# Patient Record
Sex: MALE | Race: WHITE | URBAN - METROPOLITAN AREA
[De-identification: names, ages, dates, MRNs, and addresses within clinical notes are randomized per-mention and may not be internally consistent; named-entity substitution may affect disease eponyms.]

---

## 2019-12-31 ENCOUNTER — APPOINTMENT (OUTPATIENT)
Dept: URBAN - METROPOLITAN AREA SURGERY 9 | Age: 44
Setting detail: DERMATOLOGY
End: 2019-12-31

## 2019-12-31 DIAGNOSIS — L40.8 OTHER PSORIASIS: ICD-10-CM

## 2019-12-31 DIAGNOSIS — L40.0 PSORIASIS VULGARIS: ICD-10-CM

## 2019-12-31 PROCEDURE — OTHER TREATMENT REGIMEN: OTHER

## 2019-12-31 PROCEDURE — 11900 INJECT SKIN LESIONS </W 7: CPT

## 2019-12-31 PROCEDURE — OTHER INTRALESIONAL KENALOG: OTHER

## 2019-12-31 PROCEDURE — OTHER PRESCRIPTION: OTHER

## 2019-12-31 PROCEDURE — 99202 OFFICE O/P NEW SF 15 MIN: CPT | Mod: 25

## 2019-12-31 PROCEDURE — OTHER COUNSELING: OTHER

## 2019-12-31 RX ORDER — PIMECROLIMUS 10 MG/G
CREAM TOPICAL
Qty: 1 | Refills: 2 | Status: ERX

## 2019-12-31 RX ORDER — CLOBETASOL PROPIONATE 0.5 MG/ML
SOLUTION TOPICAL QHS
Qty: 1 | Refills: 3 | Status: ERX

## 2019-12-31 ASSESSMENT — LOCATION ZONE DERM
LOCATION ZONE: TRUNK
LOCATION ZONE: GENITALIA
LOCATION ZONE: SCALP

## 2019-12-31 ASSESSMENT — LOCATION SIMPLE DESCRIPTION DERM
LOCATION SIMPLE: SCALP
LOCATION SIMPLE: GLUTEAL CLEFT
LOCATION SIMPLE: GENITALIA

## 2019-12-31 ASSESSMENT — LOCATION DETAILED DESCRIPTION DERM
LOCATION DETAILED: GLUTEAL CLEFT
LOCATION DETAILED: RIGHT SUPERIOR PARIETAL SCALP
LOCATION DETAILED: GENITALIA

## 2019-12-31 NOTE — HPI: RASH
What Type Of Note Output Would You Prefer (Optional)?: Bullet Format
How Severe Is Your Rash?: moderate
Is This A New Presentation, Or A Follow-Up?: Rash
Additional History: Pt state he was prescribed an anti fungal and a cream but doesn’t know the exact names

## 2019-12-31 NOTE — PROCEDURE: TREATMENT REGIMEN
Start Regimen: Pimecrolimus apply twice daily to affected areas
Action 1: Continue
Detail Level: Zone
Start Regimen: Clobetasol solution apply twice daily for up to three weeks as needed for flares to scalp

## 2020-06-04 ENCOUNTER — APPOINTMENT (OUTPATIENT)
Dept: URBAN - METROPOLITAN AREA SURGERY 9 | Age: 45
Setting detail: DERMATOLOGY
End: 2020-06-12

## 2020-06-04 DIAGNOSIS — L40.8 OTHER PSORIASIS: ICD-10-CM

## 2020-06-04 DIAGNOSIS — L40.0 PSORIASIS VULGARIS: ICD-10-CM

## 2020-06-04 PROBLEM — L30.9 DERMATITIS, UNSPECIFIED: Status: ACTIVE | Noted: 2020-06-04

## 2020-06-04 PROCEDURE — OTHER TREATMENT REGIMEN: OTHER

## 2020-06-04 PROCEDURE — OTHER COUNSELING: OTHER

## 2020-06-04 PROCEDURE — 99213 OFFICE O/P EST LOW 20 MIN: CPT | Mod: 25

## 2020-06-04 PROCEDURE — OTHER INTRALESIONAL KENALOG: OTHER

## 2020-06-04 PROCEDURE — OTHER PRESCRIPTION: OTHER

## 2020-06-04 PROCEDURE — 11900 INJECT SKIN LESIONS </W 7: CPT

## 2020-06-04 PROCEDURE — 11104 PUNCH BX SKIN SINGLE LESION: CPT

## 2020-06-04 PROCEDURE — OTHER BIOPSY BY PUNCH METHOD: OTHER

## 2020-06-04 RX ORDER — MOMETASONE FUROATE 1 MG/ML
SOLUTION TOPICAL
Qty: 1 | Refills: 3 | Status: ERX | COMMUNITY
Start: 2020-06-04

## 2020-06-04 RX ORDER — KETOCONAZOLE 20 MG/G
CREAM TOPICAL
Qty: 1 | Refills: 2 | Status: ERX | COMMUNITY
Start: 2020-06-04

## 2020-06-04 ASSESSMENT — LOCATION ZONE DERM
LOCATION ZONE: TRUNK
LOCATION ZONE: GENITALIA
LOCATION ZONE: SCALP

## 2020-06-04 ASSESSMENT — LOCATION DETAILED DESCRIPTION DERM
LOCATION DETAILED: RIGHT CENTRAL PARIETAL SCALP
LOCATION DETAILED: GLUTEAL CLEFT
LOCATION DETAILED: RIGHT SUPERIOR PARIETAL SCALP
LOCATION DETAILED: GENITALIA

## 2020-06-04 ASSESSMENT — LOCATION SIMPLE DESCRIPTION DERM
LOCATION SIMPLE: GENITALIA
LOCATION SIMPLE: SCALP
LOCATION SIMPLE: GLUTEAL CLEFT

## 2020-06-04 NOTE — PROCEDURE: TREATMENT REGIMEN
Action 3: Continue
Discontinue Regimen: Clobetasol solution
Detail Level: Zone
Start Regimen: Mometasone solution
Continue Regimen: Pimecrolimus apply twice daily to affected areas
Start Regimen: Ketoconazole as directed

## 2020-08-03 ENCOUNTER — APPOINTMENT (OUTPATIENT)
Dept: URBAN - METROPOLITAN AREA SURGERY 9 | Age: 45
Setting detail: DERMATOLOGY
End: 2020-08-04

## 2020-08-03 DIAGNOSIS — L40.8 OTHER PSORIASIS: ICD-10-CM

## 2020-08-03 DIAGNOSIS — L40.0 PSORIASIS VULGARIS: ICD-10-CM

## 2020-08-03 PROCEDURE — OTHER OTHER: OTHER

## 2020-08-03 PROCEDURE — OTHER TREATMENT REGIMEN: OTHER

## 2020-08-03 PROCEDURE — 99213 OFFICE O/P EST LOW 20 MIN: CPT

## 2020-08-03 PROCEDURE — OTHER COUNSELING: OTHER

## 2020-08-03 ASSESSMENT — LOCATION ZONE DERM
LOCATION ZONE: EAR
LOCATION ZONE: SCALP
LOCATION ZONE: GENITALIA
LOCATION ZONE: TRUNK

## 2020-08-03 ASSESSMENT — LOCATION DETAILED DESCRIPTION DERM
LOCATION DETAILED: LEFT ANTIHELIX
LOCATION DETAILED: GLUTEAL CLEFT
LOCATION DETAILED: RIGHT SUPERIOR PARIETAL SCALP
LOCATION DETAILED: RIGHT INFERIOR CRUS OF ANTIHELIX
LOCATION DETAILED: GENITALIA

## 2020-08-03 ASSESSMENT — LOCATION SIMPLE DESCRIPTION DERM
LOCATION SIMPLE: GENITALIA
LOCATION SIMPLE: RIGHT EAR
LOCATION SIMPLE: GLUTEAL CLEFT
LOCATION SIMPLE: SCALP
LOCATION SIMPLE: LEFT EAR

## 2020-08-03 NOTE — PROCEDURE: TREATMENT REGIMEN
Plan: Can continue to use mometasone in scalp as needed.  Good improvement.  Will be trimming hair shorter which may help with increased sun. Other Instructions: Can continue to use mometasone in scalp as needed.  Good improvement.  Will be trimming hair shorter which may help with increased sun.

## 2020-08-03 NOTE — PROCEDURE: OTHER
Note Text (......Xxx Chief Complaint.): This diagnosis correlates with the
Other (Free Text): No joint symptoms
Detail Level: Simple

## 2020-11-20 RX ORDER — MOMETASONE FUROATE 1 MG/ML
SOLUTION TOPICAL
Qty: 1 | Refills: 3 | Status: ERX

## 2020-11-20 RX ORDER — PIMECROLIMUS 10 MG/G
CREAM TOPICAL
Qty: 1 | Refills: 2 | Status: ERX

## 2021-01-26 ENCOUNTER — APPOINTMENT (OUTPATIENT)
Dept: URBAN - METROPOLITAN AREA SURGERY 9 | Age: 46
Setting detail: DERMATOLOGY
End: 2021-01-26

## 2021-01-26 DIAGNOSIS — L40.8 OTHER PSORIASIS: ICD-10-CM

## 2021-01-26 DIAGNOSIS — L40.0 PSORIASIS VULGARIS: ICD-10-CM

## 2021-01-26 PROCEDURE — OTHER TREATMENT REGIMEN: OTHER

## 2021-01-26 PROCEDURE — OTHER PRESCRIPTION MEDICATION MANAGEMENT: OTHER

## 2021-01-26 PROCEDURE — OTHER COUNSELING: OTHER

## 2021-01-26 PROCEDURE — OTHER PRESCRIPTION: OTHER

## 2021-01-26 PROCEDURE — OTHER OTHER: OTHER

## 2021-01-26 PROCEDURE — 99214 OFFICE O/P EST MOD 30 MIN: CPT

## 2021-01-26 RX ORDER — HYDROCORTISONE BUTYRATE 1 MG/G
CREAM TOPICAL
Qty: 1 | Refills: 2 | Status: ERX

## 2021-01-26 ASSESSMENT — LOCATION SIMPLE DESCRIPTION DERM
LOCATION SIMPLE: RIGHT EAR
LOCATION SIMPLE: SCALP
LOCATION SIMPLE: GENITALIA
LOCATION SIMPLE: LEFT EAR
LOCATION SIMPLE: GLUTEAL CLEFT

## 2021-01-26 ASSESSMENT — LOCATION DETAILED DESCRIPTION DERM
LOCATION DETAILED: RIGHT SUPERIOR PARIETAL SCALP
LOCATION DETAILED: GLUTEAL CLEFT
LOCATION DETAILED: RIGHT INFERIOR CRUS OF ANTIHELIX
LOCATION DETAILED: LEFT ANTIHELIX
LOCATION DETAILED: GENITALIA

## 2021-01-26 ASSESSMENT — LOCATION ZONE DERM
LOCATION ZONE: TRUNK
LOCATION ZONE: GENITALIA
LOCATION ZONE: SCALP
LOCATION ZONE: EAR

## 2021-01-26 NOTE — PROCEDURE: OTHER
Note Text (......Xxx Chief Complaint.): This diagnosis correlates with the
Other (Free Text): No joint symptoms. He will be seeing rheum next week for further evaluation.\\n\\nHe is describing mouth ulcers and potential genital ulcers.  Bechets is a possibility.  Otezla could treat both his PSO and Bechets if this is thought to be a possibility.
Detail Level: Simple
Render Risk Assessment In Note?: yes

## 2021-01-26 NOTE — PROCEDURE: PRESCRIPTION MEDICATION MANAGEMENT
Initiate Treatment: hydrocortisone butyrate for flares
Continue Regimen: pimecrolimus + ketoconazole for maintenance and mild flares.
Detail Level: Zone
Plan: Call if this worsens.  Holding systemic treatment until rhuem eval.
Render In Strict Bullet Format?: No

## 2021-01-26 NOTE — PROCEDURE: TREATMENT REGIMEN
Other Instructions: Can continue to use mometasone in scalp as needed.  Good improvement.  Will be trimming hair shorter which may help with increased sun. Plan: Can continue to use mometasone in scalp as needed.  Good improvement.  Will be trimming hair shorter which may help with increased sun.

## 2021-07-30 ENCOUNTER — APPOINTMENT (OUTPATIENT)
Dept: URBAN - METROPOLITAN AREA SURGERY 9 | Age: 46
Setting detail: DERMATOLOGY
End: 2021-07-30

## 2021-07-30 DIAGNOSIS — L40.0 PSORIASIS VULGARIS: ICD-10-CM

## 2021-07-30 DIAGNOSIS — L40.8 OTHER PSORIASIS: ICD-10-CM

## 2021-07-30 PROCEDURE — OTHER TREATMENT REGIMEN: OTHER

## 2021-07-30 PROCEDURE — OTHER OTHER: OTHER

## 2021-07-30 PROCEDURE — OTHER PRESCRIPTION MEDICATION MANAGEMENT: OTHER

## 2021-07-30 PROCEDURE — OTHER COUNSELING: OTHER

## 2021-07-30 PROCEDURE — 99214 OFFICE O/P EST MOD 30 MIN: CPT

## 2021-07-30 PROCEDURE — OTHER PRESCRIPTION: OTHER

## 2021-07-30 RX ORDER — HYDROCORTISONE BUTYRATE 1 MG/G
CREAM TOPICAL
Qty: 1 | Refills: 2 | Status: ERX | COMMUNITY
Start: 2021-07-30

## 2021-07-30 RX ORDER — PIMECROLIMUS 10 MG/G
CREAM TOPICAL
Qty: 1 | Refills: 2 | Status: ERX | COMMUNITY
Start: 2021-07-30

## 2021-07-30 RX ORDER — KETOCONAZOLE 20 MG/G
CREAM TOPICAL
Qty: 1 | Refills: 1 | Status: ERX | COMMUNITY
Start: 2021-07-30

## 2021-07-30 ASSESSMENT — PGA PSORIASIS: PGA PSORIASIS 2020: MILD

## 2021-07-30 ASSESSMENT — LOCATION DETAILED DESCRIPTION DERM
LOCATION DETAILED: LEFT ANTIHELIX
LOCATION DETAILED: GLUTEAL CLEFT
LOCATION DETAILED: RIGHT INFERIOR CRUS OF ANTIHELIX
LOCATION DETAILED: GENITALIA
LOCATION DETAILED: RIGHT SUPERIOR PARIETAL SCALP

## 2021-07-30 ASSESSMENT — LOCATION SIMPLE DESCRIPTION DERM
LOCATION SIMPLE: SCALP
LOCATION SIMPLE: RIGHT EAR
LOCATION SIMPLE: LEFT EAR
LOCATION SIMPLE: GENITALIA
LOCATION SIMPLE: GLUTEAL CLEFT

## 2021-07-30 ASSESSMENT — LOCATION ZONE DERM
LOCATION ZONE: EAR
LOCATION ZONE: GENITALIA
LOCATION ZONE: TRUNK
LOCATION ZONE: SCALP

## 2021-07-30 ASSESSMENT — BSA PSORIASIS: % BODY COVERED IN PSORIASIS: 6

## 2021-07-30 NOTE — PROCEDURE: PRESCRIPTION MEDICATION MANAGEMENT
Initiate Treatment: hydrocortisone butyrate for flares
Continue Regimen: pimecrolimus + ketoconazole for maintenance and mild flares.
Detail Level: Zone
Render In Strict Bullet Format?: No

## 2021-07-30 NOTE — PROCEDURE: TREATMENT REGIMEN
Action 4: Continue
Start Regimen: Mometasone solution to the inside of the ears with a qtip
Other Instructions: Can consider Otezla or IL-17 if not improving.
Detail Level: Zone

## 2022-01-26 ENCOUNTER — APPOINTMENT (OUTPATIENT)
Dept: URBAN - METROPOLITAN AREA SURGERY 9 | Age: 47
Setting detail: DERMATOLOGY
End: 2022-01-26

## 2022-01-26 DIAGNOSIS — L40.8 OTHER PSORIASIS: ICD-10-CM

## 2022-01-26 DIAGNOSIS — L40.0 PSORIASIS VULGARIS: ICD-10-CM

## 2022-01-26 PROCEDURE — OTHER PRESCRIPTION MEDICATION MANAGEMENT: OTHER

## 2022-01-26 PROCEDURE — 99214 OFFICE O/P EST MOD 30 MIN: CPT

## 2022-01-26 PROCEDURE — OTHER COUNSELING: OTHER

## 2022-01-26 PROCEDURE — OTHER PRESCRIPTION: OTHER

## 2022-01-26 RX ORDER — CLOBETASOL PROPIONATE 0.05 G/100ML
SHAMPOO TOPICAL
Qty: 118 | Refills: 2 | Status: ERX | COMMUNITY
Start: 2022-01-26

## 2022-01-26 ASSESSMENT — PGA PSORIASIS: PGA PSORIASIS 2020: MILD

## 2022-01-26 ASSESSMENT — LOCATION SIMPLE DESCRIPTION DERM
LOCATION SIMPLE: GLUTEAL CLEFT
LOCATION SIMPLE: RIGHT EAR
LOCATION SIMPLE: LEFT EAR
LOCATION SIMPLE: SCALP
LOCATION SIMPLE: GENITALIA

## 2022-01-26 ASSESSMENT — LOCATION ZONE DERM
LOCATION ZONE: EAR
LOCATION ZONE: TRUNK
LOCATION ZONE: SCALP
LOCATION ZONE: GENITALIA

## 2022-01-26 ASSESSMENT — BSA PSORIASIS: % BODY COVERED IN PSORIASIS: 3

## 2022-01-26 ASSESSMENT — LOCATION DETAILED DESCRIPTION DERM
LOCATION DETAILED: LEFT ANTIHELIX
LOCATION DETAILED: GLUTEAL CLEFT
LOCATION DETAILED: LEFT SUPERIOR PARIETAL SCALP
LOCATION DETAILED: RIGHT SUPERIOR PARIETAL SCALP
LOCATION DETAILED: GENITALIA
LOCATION DETAILED: RIGHT INFERIOR CRUS OF ANTIHELIX
LOCATION DETAILED: RIGHT TRIANGULAR FOSSA

## 2022-01-26 NOTE — PROCEDURE: PRESCRIPTION MEDICATION MANAGEMENT
Continue Regimen: pimecrolimus + ketoconazole for maintenance and mild flares.\\n\\nhydrocortisone butyrate for flares
Detail Level: Zone
Plan: Patient can contact office refills needed.
Render In Strict Bullet Format?: No
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  He says there has been some talk about Humira.  He feels that he is slowly improving and we reviewed that this is the expectation.  Since being on the MTX, his chronic leukopenia has seen an uptrend in his WBC's.  He is looking for a new hematologist.\\n\\nFrom a skin standpoint, we will attempt to optimize control on his scalp, otherwise he is doing very well.  We will extend his follow up to yearly and he can call in the interim if this changes.
Initiate Treatment: Clobetasol shampoo as directed.
Continue Regimen: Mometasone solution as directed (patient can contact office when refills are needed)

## 2022-02-24 ENCOUNTER — RX ONLY (RX ONLY)
Age: 47
End: 2022-02-24

## 2022-02-24 RX ORDER — KETOCONAZOLE 20 MG/G
CREAM TOPICAL
Qty: 60 | Refills: 2 | Status: ERX

## 2022-02-24 RX ORDER — PIMECROLIMUS 10 MG/G
CREAM TOPICAL
Qty: 60 | Refills: 3 | Status: ERX

## 2023-01-27 ENCOUNTER — APPOINTMENT (OUTPATIENT)
Dept: URBAN - METROPOLITAN AREA SURGERY 9 | Age: 48
Setting detail: DERMATOLOGY
End: 2023-01-27

## 2023-01-27 DIAGNOSIS — L40.0 PSORIASIS VULGARIS: ICD-10-CM

## 2023-01-27 DIAGNOSIS — L40.8 OTHER PSORIASIS: ICD-10-CM

## 2023-01-27 PROCEDURE — 99214 OFFICE O/P EST MOD 30 MIN: CPT

## 2023-01-27 PROCEDURE — OTHER MIPS QUALITY: OTHER

## 2023-01-27 PROCEDURE — OTHER PRESCRIPTION MEDICATION MANAGEMENT: OTHER

## 2023-01-27 PROCEDURE — OTHER COUNSELING: OTHER

## 2023-01-27 ASSESSMENT — LOCATION ZONE DERM
LOCATION ZONE: GENITALIA
LOCATION ZONE: EAR
LOCATION ZONE: SCALP
LOCATION ZONE: TRUNK

## 2023-01-27 ASSESSMENT — ITCH NUMERIC RATING SCALE: HOW SEVERE IS YOUR ITCHING?: 1

## 2023-01-27 ASSESSMENT — LOCATION SIMPLE DESCRIPTION DERM
LOCATION SIMPLE: GLUTEAL CLEFT
LOCATION SIMPLE: RIGHT EAR
LOCATION SIMPLE: GENITALIA
LOCATION SIMPLE: LEFT EAR
LOCATION SIMPLE: SCALP

## 2023-01-27 ASSESSMENT — LOCATION DETAILED DESCRIPTION DERM
LOCATION DETAILED: RIGHT SUPERIOR PARIETAL SCALP
LOCATION DETAILED: RIGHT TRIANGULAR FOSSA
LOCATION DETAILED: GENITALIA
LOCATION DETAILED: LEFT SUPERIOR PARIETAL SCALP
LOCATION DETAILED: GLUTEAL CLEFT
LOCATION DETAILED: RIGHT INFERIOR CRUS OF ANTIHELIX
LOCATION DETAILED: LEFT ANTIHELIX

## 2023-01-27 ASSESSMENT — BSA PSORIASIS: % BODY COVERED IN PSORIASIS: 2

## 2023-01-27 NOTE — PROCEDURE: PRESCRIPTION MEDICATION MANAGEMENT
Modify Regimen: Clobetasol shampoo leaving on for 15 minutes before washing off.
Planning on Humira with rheum.\\nCan try taclonex if this does not work. \\nCan consider an IL-17 or -23 if insurance does not want cover Humira.
Detail Level: Zone
Render In Strict Bullet Format?: No
Continue Regimen: pimecrolimus + ketoconazole for maintenance and mild flares.\\n\\nhydrocortisone butyrate for flares
Plan: Patient can contact office refills needed.

## 2023-10-04 ENCOUNTER — APPOINTMENT (OUTPATIENT)
Dept: URBAN - METROPOLITAN AREA SURGERY 9 | Age: 48
Setting detail: DERMATOLOGY
End: 2023-10-04

## 2023-10-04 DIAGNOSIS — L40.0 PSORIASIS VULGARIS: ICD-10-CM

## 2023-10-04 PROBLEM — L30.9 DERMATITIS, UNSPECIFIED: Status: ACTIVE | Noted: 2023-10-04

## 2023-10-04 PROCEDURE — OTHER PRESCRIPTION MEDICATION MANAGEMENT: OTHER

## 2023-10-04 PROCEDURE — OTHER PRESCRIPTION: OTHER

## 2023-10-04 PROCEDURE — OTHER COUNSELING: OTHER

## 2023-10-04 PROCEDURE — 99214 OFFICE O/P EST MOD 30 MIN: CPT

## 2023-10-04 PROCEDURE — OTHER MIPS QUALITY: OTHER

## 2023-10-04 RX ORDER — BETAMETHASONE DIPROPIONATE 0.5 MG/G
CREAM, AUGMENTED TOPICAL
Qty: 50 | Refills: 1 | Status: ERX | COMMUNITY
Start: 2023-10-04

## 2023-10-04 ASSESSMENT — LOCATION DETAILED DESCRIPTION DERM
LOCATION DETAILED: RIGHT RADIAL DORSAL HAND
LOCATION DETAILED: LEFT ULNAR DORSAL HAND

## 2023-10-04 ASSESSMENT — LOCATION SIMPLE DESCRIPTION DERM
LOCATION SIMPLE: RIGHT HAND
LOCATION SIMPLE: LEFT HAND

## 2023-10-04 ASSESSMENT — LOCATION ZONE DERM: LOCATION ZONE: HAND

## 2023-10-04 NOTE — PROCEDURE: PRESCRIPTION MEDICATION MANAGEMENT
Initiate Treatment: Betamethasone as prescribed
Render In Strict Bullet Format?: No
Detail Level: Zone
Plan: Began shortly after TNF.  Reports that he most likely will begin IL-17.  Rash is nonspecific we will treat with TCS.  Despite continuing MTX, this has not improved.  If this persists/worsens, bx will be considered.  Relatively mild today and I believe that the diagnostic yield on a bx would be nonspecific.

## 2023-10-04 NOTE — HPI: RASH
Is This A New Presentation, Or A Follow-Up?: Rash
Additional History: Pt started Humira, rash developed on hands. Stopped Humira but rash did not go away.
